# Patient Record
Sex: FEMALE | Race: WHITE | NOT HISPANIC OR LATINO | Employment: OTHER | URBAN - METROPOLITAN AREA
[De-identification: names, ages, dates, MRNs, and addresses within clinical notes are randomized per-mention and may not be internally consistent; named-entity substitution may affect disease eponyms.]

---

## 2017-03-29 PROBLEM — M48.00 SPINAL STENOSIS: Status: ACTIVE | Noted: 2017-03-29

## 2017-04-10 ENCOUNTER — CONSULT (OUTPATIENT)
Dept: CARDIOLOGY | Facility: CLINIC | Age: 72
End: 2017-04-10

## 2017-04-10 VITALS
HEART RATE: 72 BPM | SYSTOLIC BLOOD PRESSURE: 150 MMHG | WEIGHT: 171 LBS | BODY MASS INDEX: 34.47 KG/M2 | HEIGHT: 59 IN | DIASTOLIC BLOOD PRESSURE: 60 MMHG

## 2017-04-10 DIAGNOSIS — R06.09 DYSPNEA ON EXERTION: Primary | ICD-10-CM

## 2017-04-10 PROBLEM — E78.5 DYSLIPIDEMIA: Status: ACTIVE | Noted: 2017-04-10

## 2017-04-10 PROBLEM — I25.10 CORONARY ARTERY DISEASE INVOLVING NATIVE CORONARY ARTERY OF NATIVE HEART WITHOUT ANGINA PECTORIS: Status: ACTIVE | Noted: 2017-04-10

## 2017-04-10 PROBLEM — I10 ESSENTIAL HYPERTENSION: Status: ACTIVE | Noted: 2017-04-10

## 2017-04-10 PROCEDURE — 99204 OFFICE O/P NEW MOD 45 MIN: CPT | Performed by: INTERNAL MEDICINE

## 2017-04-10 PROCEDURE — 93000 ELECTROCARDIOGRAM COMPLETE: CPT | Performed by: INTERNAL MEDICINE

## 2017-04-10 RX ORDER — OMEPRAZOLE 20 MG/1
20 CAPSULE, DELAYED RELEASE ORAL 2 TIMES DAILY
COMMUNITY

## 2017-04-10 RX ORDER — ATORVASTATIN CALCIUM 40 MG/1
40 TABLET, FILM COATED ORAL DAILY
COMMUNITY

## 2017-04-10 RX ORDER — GLIPIZIDE 10 MG/1
10 TABLET ORAL DAILY
COMMUNITY

## 2017-04-10 RX ORDER — NITROGLYCERIN 0.4 MG/1
0.4 TABLET SUBLINGUAL
COMMUNITY

## 2017-04-10 RX ORDER — LISINOPRIL 40 MG/1
40 TABLET ORAL DAILY
COMMUNITY

## 2017-04-10 RX ORDER — HYDROCHLOROTHIAZIDE 25 MG/1
25 TABLET ORAL DAILY
COMMUNITY

## 2017-04-10 RX ORDER — GABAPENTIN 600 MG/1
600 TABLET ORAL 2 TIMES DAILY
COMMUNITY

## 2017-04-10 RX ORDER — AMLODIPINE BESYLATE 5 MG/1
5 TABLET ORAL DAILY
COMMUNITY

## 2017-04-10 RX ORDER — HYDROCODONE BITARTRATE AND ACETAMINOPHEN 5; 325 MG/1; MG/1
1 TABLET ORAL DAILY PRN
COMMUNITY

## 2017-04-10 NOTE — PROGRESS NOTES
"Subjective:     Encounter Date:04/10/2017      Patient ID: Manpreet France is a 72 y.o. female.    Chief Complaint:Shortness of Breath (Consult); Edema; and Numbness (both legs)    PROBLEM LIST:  1. Dyspnea on exertion  a. 2011 normal MPS  b. lexiscan 2014 negative for ischemia  c. Lexiscan 3/2016 no ischemia  d. 3/2016 ECHO normal EF without any significant valve disease  2. Hypertension  3. Dyslipidemia  4. Diabetes mellitus  5. Neuropathy  6. Peptic ulcer disease  7. Surgeries:  a. Brain tumor removal  b. Total abdominal hysterectomy secondary to \"cancer\"  c. Facial reconstruction  d. Back surgery        No Known Allergies      Current Outpatient Prescriptions:   •  amLODIPine (NORVASC) 5 MG tablet, Take 5 mg by mouth Daily., Disp: , Rfl:   •  atorvastatin (LIPITOR) 40 MG tablet, Take 40 mg by mouth Daily., Disp: , Rfl:   •  betamethasone valerate (VALISONE) 0.1 % ointment, Apply  topically 2 (Two) Times a Day., Disp: , Rfl:   •  gabapentin (NEURONTIN) 600 MG tablet, Take 600 mg by mouth 2 (Two) Times a Day. Pt takes 1 tablet in the am and 2 tablets at bedtime, Disp: , Rfl:   •  glipiZIDE (GLUCOTROL) 10 MG tablet, Take 10 mg by mouth Daily. Pt takes 2 tablets daily, Disp: , Rfl:   •  hydrochlorothiazide (HYDRODIURIL) 25 MG tablet, Take 25 mg by mouth Daily., Disp: , Rfl:   •  HYDROcodone-acetaminophen (NORCO) 5-325 MG per tablet, Take 1 tablet by mouth Daily As Needed., Disp: , Rfl:   •  lisinopril (PRINIVIL,ZESTRIL) 40 MG tablet, Take 40 mg by mouth Daily., Disp: , Rfl:   •  metFORMIN (GLUCOPHAGE) 500 MG tablet, Take 500 mg by mouth Daily. Pt takes 4 tablets at bedtime, Disp: , Rfl:   •  nitroglycerin (NITROSTAT) 0.4 MG SL tablet, Place 0.4 mg under the tongue Every 5 (Five) Minutes As Needed for Chest Pain. Take no more than 3 doses in 15 minutes., Disp: , Rfl:   •  omeprazole (priLOSEC) 20 MG capsule, Take 20 mg by mouth 2 (Two) Times a Day., Disp: , Rfl:   •  Polyethylene Glycol 3350 (MIRALAX PO), Take  by " mouth Daily., Disp: , Rfl:   •  Sulfamethoxazole-Trimethoprim (BACTRIM PO), Take  by mouth 2 (Two) Times a Day., Disp: , Rfl:         History of Present Illness    Patient is a 72 year  female who we are seeing today for further evaluation of dyspnea.  She has no previous history of coronary disease.  By record review she has had multiple ischemic evaluations performed in the past.  Most recent was in March 2016 with a Lexiscan Cardiolite that was negative for ischemia.  Patient does note some shortness of breath at times.  She has multiple risk factors including hypertension, dyslipidemia, and diabetes.  She notes over the last year she has had some recurrent left-sided chest discomfort.  This comes and goes without any specific triggering factor.  It does not seem to be exertion related.  It is not been getting any worse.  It last for a few seconds and then is gone.  There are no known associated symptoms.  She notes that with walking up stairs or inclines she will consult short of breath but notes also some audible wheezing.  Also has some occasional bilateral lower extremity edema which is noted to develop throughout the day and is better with elevation of her extremities in the morning.  Denies any syncope or near syncope.  Has not had any pulmonary evaluation.  His limited primarily by orthopedic issues denies any exertional chest pain or dyspnea.  Does note some wheezing.  The following portions of the patient's history were reviewed and updated as appropriate: allergies, current medications, past family history, past medical history, past social history, past surgical history and problem list.    Family History   Problem Relation Age of Onset   • Hypertension Mother    • No Known Problems Father    • Hypertension Brother    • COPD Brother        Social History   Substance Use Topics   • Smoking status: Former Smoker   • Smokeless tobacco: Never Used      Comment: quit 17 years ago   • Alcohol use No  "        Review of Systems   Constitution: Positive for weakness and malaise/fatigue. Negative for fever.   HENT: Negative for headaches and nosebleeds.    Eyes: Negative for redness and visual disturbance.   Cardiovascular: Positive for leg swelling. Negative for orthopnea, palpitations and paroxysmal nocturnal dyspnea.   Respiratory: Positive for snoring and wheezing. Negative for cough and sputum production.    Endocrine: Positive for cold intolerance and heat intolerance.   Hematologic/Lymphatic: Negative for bleeding problem.   Skin: Negative for flushing, itching and rash.   Musculoskeletal: Positive for arthritis and muscle weakness. Negative for falls, joint pain and muscle cramps.   Gastrointestinal: Negative for abdominal pain, diarrhea, heartburn, nausea and vomiting.   Genitourinary: Positive for bladder incontinence. Negative for hematuria.   Neurological: Positive for vertigo. Negative for excessive daytime sleepiness, dizziness and tremors.   Psychiatric/Behavioral: Positive for depression. Negative for substance abuse. The patient is not nervous/anxious.           Objective:    height is 59\" (149.9 cm) and weight is 171 lb (77.6 kg). Her blood pressure is 150/60 and her pulse is 72.         Physical Exam   Constitutional: She is oriented to person, place, and time. She appears well-developed and well-nourished.   HENT:   Head: Normocephalic and atraumatic.   Mouth/Throat: Oropharynx is clear and moist.   Eyes: Conjunctivae are normal. Pupils are equal, round, and reactive to light.   Neck: Normal carotid pulses and no JVD present. Carotid bruit is not present. No thyromegaly present.   Cardiovascular: Normal rate, regular rhythm, S1 normal and S2 normal.  Exam reveals no gallop and no friction rub.    Murmur ( left upper sternal border  1 out of 6) heard.  Pulses:       Carotid pulses are 2+ on the right side, and 2+ on the left side.       Dorsalis pedis pulses are 2+ on the right side, and 2+ on the " left side.        Posterior tibial pulses are 2+ on the right side, and 2+ on the left side.   Pulmonary/Chest: No respiratory distress. She has no wheezes. She has no rales. She exhibits no tenderness.   Abdominal: She exhibits no distension, no abdominal bruit and no mass. There is no hepatosplenomegaly. There is no tenderness. There is no rebound.   Musculoskeletal: She exhibits no edema, tenderness or deformity.   Lymphadenopathy:     She has no cervical adenopathy.   Neurological: She is alert and oriented to person, place, and time. She has normal strength.   Skin: Skin is warm and dry. No rash noted. No cyanosis. Nails show no clubbing.   Psychiatric: She has a normal mood and affect. Cognition and memory are normal.         ECG 12 Lead  Date/Time: 4/10/2017 1:54 PM  Performed by: JAKE CHOU  Authorized by: JAKE CHOU   Rhythm: sinus rhythm  Clinical impression: normal ECG                  Assessment:   Assessment/Plan      Manpreet was seen today for shortness of breath, edema and numbness.    Diagnoses and all orders for this visit:    Dyspnea on exertion    Other orders  -     ECG 12 Lead      Coronary artery disease: Check risk factors including diabetes dyslipidemia and remote history of tobacco abuse.  Calcification noted on CT/MRI this.  No xertional angina.  Question of dyspnea on exertion, patient thinks she is at her baseline but does note some wheezing on heavy exertion.  She thinks his unchanged in the past year.  Normal noninvasive cardiac testing 1 year ago    At this time, the patient is at her baseline and for her multiple coronary risk factors and normal/low risk noninvasive evaluations she simply needs ongoing continue aggressive preventative strategies.  She will add an aspirin 81 mg daily.  Continue statin, and we'll check with her primary care to ensure that her LDL is near goal of 70.  Blood pressure is well controlled.  I do not think she necessarily needs cardiac  catheter/revascularization at this time, given the patient's stable symptoms, low risk noninvasive testing, and the fact that the patient does not necessarily want this.  Medical therapy should suffice, patient understands is develops chest pain or worsened exertional symptoms she needs to notify a physician for possible revascularization.  Otherwise given her poor functional status, and multiple risk factors/coronary calcification, would recommend periodic risk stratification, perhaps every 3-5 years.       Farrah CASTILLO scribed portions of this dictation for  Dr. Pimentel.  I, Aryan Pimentel MD, personally performed the services described in this documentation as scribed by the above individual in my presence, and it is both accurate and complete    Please note that portions of this note may have been completed with a voice recognition program. Efforts were made to edit the dictations, but occasionally words are mistranscribed.